# Patient Record
(demographics unavailable — no encounter records)

---

## 2025-03-26 NOTE — HISTORY OF PRESENT ILLNESS
[FreeTextEntry1] : Arabella is a 63 y/o female here for a consultation visit, hemorrhoids.   Colonoscopy on 11/13/24 - Hemorrhoids found on perianal exam. Internal hemorrhoids. Diverticulosis in the descending colon. No specimens collected.

## 2025-03-26 NOTE — HISTORY OF PRESENT ILLNESS
[FreeTextEntry1] : Arabella is a 61 y/o female here for a consultation visit, hemorrhoids.   Colonoscopy on 11/13/24 - Hemorrhoids found on perianal exam. Internal hemorrhoids. Diverticulosis in the descending colon. No specimens collected.

## 2025-04-14 NOTE — CONSULT LETTER
[Dear  ___] : Dear  [unfilled], [Courtesy Letter:] : I had the pleasure of seeing your patient, [unfilled], in my office today. [Please see my note below.] : Please see my note below. [Sincerely,] : Sincerely, [FreeTextEntry2] : Dr Momo Baumann [FreeTextEntry3] : Palma Bettencourt M.D., F.A.C.S., F.A.S.C.R.S. Assistant Professor of Surgery [employer] [facility] [employer] at [facility]

## 2025-04-14 NOTE — CONSULT LETTER
[Dear  ___] : Dear  [unfilled], [Courtesy Letter:] : I had the pleasure of seeing your patient, [unfilled], in my office today. [Please see my note below.] : Please see my note below. [Sincerely,] : Sincerely, [FreeTextEntry2] : Dr Momo Baumann [FreeTextEntry3] : Palma Bettencourt M.D., F.A.C.S., F.A.S.C.R.S. Assistant Professor of Surgery [employer] [facility] [employer] at [facility]

## 2025-04-14 NOTE — HISTORY OF PRESENT ILLNESS
[FreeTextEntry1] : Arabella is a 63 y/o female here for a follow up visit.   Colonoscopy on 11/13/24 - Hemorrhoids found on perianal exam. Internal hemorrhoids. Diverticulosis in the descending colon. No specimens collected.  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 Patient reports intermittent passage of stool through vagina and urine, with fecal odor in urine. Symptoms began in summer 2024. Previous investigations include CT showing thickened bladder and colonic inflammation, cystoscopy revealing feces in bladder, and incomplete colonoscopy due to severe stenosis in distal descending colon. Physical examination unremarkable. High fistula suspected, likely due to diverticulitis, but malignancy cannot be ruled out.  - Plan:  - Obtain CT of abdomen and pelvis with PO and IV contrast  - Order urinalysis with reflex urine culture  - Schedule follow-up appointment next Monday at [facility] office to discuss CT results and finalize treatment plan  - Discussed potential diagnoses including colovesical and colovaginal fistula  - Explained possible etiologies: diverticulitis, malignancy, or IBD  - Informed patient that treatment typically includes surgery and provided details of the surgical procedure.  CT A+P on 4/3/25 - Sigmoid diverticulosis with wall thickening of the distal sigmoid colon abutting the superolateral aspect of the urinary bladder with likely fistulization into the bladder. Air is seen within the urinary bladder and there is wall thickening of the left superior and lateral bladder.  Today pt reports feeling no pain.  Formed BMs daily, no straining, no bleeding.  Still sees flakes of fecal matter from the vagina and occasional vaginal pain when voiding.  Denies swelling or prolapse tissues.    No episodes of incontinence of stool or flatus.  Good appetite.  No c/o nausea or vomiting.  Denies fever and chills.  Not on anticoagulants.

## 2025-04-14 NOTE — ASSESSMENT
[FreeTextEntry1] :  62-year-old female presenting with fecaluria and CT consistent with colovesical fistula. The patient in our first consultation reported brown discharge from the vagina and fecal excrement in the urine.  Today she states that what she meant to say when describing discharge from the vagina was actually the feces in the urine. Symptoms began in summer 2024. Had incomplete colonoscopy due to described severe stenosis in distal descending colon.  Patient with at least colovaginal fistula, possible colovaginal fistula as well, most likely due to diverticulitis, but malignancy cannot be ruled out. Her urine culture was positive for UTI and antibiotics were prescribed. I recommended robotic assisted/laparoscopic sigmoid resection, possible open, with takedown of colovesical fistula. I discussed that the plan is for colon resection with anastomosis. I explained there is small possibility of need for an end colostomy and a possibility for a diverting loop ileostomy depending on the extent inflammation found intraoperatively.   I discussed the details, risks, benefits, alternatives of the procedure and expectations of recovery with the patient. I explained urology will be placing ureteral catheters at the beginning of the procedure.  I advised for gynecology standby for the possibility of the uterus or the left fallopian tube or ovary being involved in the disease process She will be under the Enhanced Recovery Protocol and I discussed the details of the protocol and the significant impact in early mobilization, pain control and faster return of bowel fx. She needs to work currently and looking to schedule the surgery for end of June.  I advised her to seek medical attention if she develops abdominal pain or fever. She will need urology and gynecology consultation and medical clearance. I will obtain a CT colonography preoperatively. All questions answered to the patient's satisfaction.

## 2025-04-14 NOTE — PHYSICAL EXAM
[FreeTextEntry1] : This is a 62 year-old well-developed female in no apparent distress.  Abdomen soft, nontender, nondistended, no masses.   Psychiatric-oriented to time place and person. Good understanding of conversation.

## 2025-04-14 NOTE — CONSULT LETTER
[Dear  ___] : Dear  [unfilled], [Courtesy Letter:] : I had the pleasure of seeing your patient, [unfilled], in my office today. [Please see my note below.] : Please see my note below. [Sincerely,] : Sincerely, [FreeTextEntry2] : Dr Momo Baumann [FreeTextEntry3] : Palma Bettencourt M.D., F.A.C.S., F.A.S.C.R.S. Assistant Professor of Surgery [employer] [facility] [employer] at [facility]

## 2025-05-16 NOTE — HISTORY OF PRESENT ILLNESS
[FreeTextEntry1] : 2025 ( Dr Palma Moon) " I recommended robotic assisted/laparoscopic sigmoid resection, possible open, with takedown of colovesical fistula. I discussed that the plan is for colon resection with anastomosis. I explained there is small possibility of need for an end colostomy and a possibility for a diverting loop ileostomy depending on the extent inflammation found intraoperatively. I discussed the details, risks, benefits, alternatives of the procedure and expectations of recovery with the patient. I explained urology will be placing ureteral catheters at the beginning of the procedure. I advised for gynecology standby for the possibility of the uterus or the left fallopian tube or ovary being involved in the disease process".   patient wht hx of dysuria and vag pain 2024 wiht utis but not resolved - inital CT did not see fistula and recenlty had fecaluria and air inthe past few months  no fever , no N/V or hematria  doroteo habits good  no pelvic surgery or radiation or hx of diverticultis  last colonscopy 5 years ago -diverticulosis found  and agan in 2024 due to recent findings above no fh of bowel disease  not sexually active   - admits to increased water intake  CT SCAN ( 2025): images reviewed wiht patient IMPRESSION: Sigmoid diverticulosis with wall thickening of the distal sigmoid colon abutting the superolateral aspect of the urinary bladder with likely fistulization into the bladder. Air is seen within the urinary bladder and there is wall thickening of the left superior and lateral bladder.

## 2025-05-16 NOTE — ASSESSMENT
[FreeTextEntry1] : 1- u cath placement risks and benefits discussed - brochure given  2- patient understands I will not be operating surgeon for procedure

## 2025-05-16 NOTE — PHYSICAL EXAM
[Normal Appearance] : normal appearance [Well Groomed] : well groomed [General Appearance - In No Acute Distress] : no acute distress [Edema] : no peripheral edema [Respiration, Rhythm And Depth] : normal respiratory rhythm and effort [Exaggerated Use Of Accessory Muscles For Inspiration] : no accessory muscle use [Abdomen Soft] : soft [Abdomen Tenderness] : non-tender [Costovertebral Angle Tenderness] : no ~M costovertebral angle tenderness [Normal Station and Gait] : the gait and station were normal for the patient's age [] : no rash [No Focal Deficits] : no focal deficits [Oriented To Time, Place, And Person] : oriented to person, place, and time [Affect] : the affect was normal [Mood] : the mood was normal [No Palpable Adenopathy] : no palpable adenopathy [de-identified] : umbilcal [place]den

## 2025-07-21 NOTE — CONSULT LETTER
[Dear  ___] : Dear  [unfilled], [Courtesy Letter:] : I had the pleasure of seeing your patient, [unfilled], in my office today. [Please see my note below.] : Please see my note below. [Sincerely,] : Sincerely, [FreeTextEntry2] : Dr Momo Baumann [FreeTextEntry1] : Pls see the attached op and path reports [FreeTextEntry3] : Palma Bettencourt M.D., F.A.C.S., F.A.S.C.R.S. Assistant Professor of Surgery [employer] [facility] [employer] at [facility]

## 2025-07-21 NOTE — PHYSICAL EXAM
[FreeTextEntry1] : Comfortable. Abdomen soft, non-tender, non-distended. Port incisions & midline extraction wound healing well. No hernias.

## 2025-07-21 NOTE — ASSESSMENT
[FreeTextEntry1] : Doing well postop. Path discussed.  Instructions for diet (continue LRD until next visit), activity, wound care given, all questions answered. Take a dose of Miralax if no BMs for more than a day. Will send UA, reflex Cx  RTO 8/11

## 2025-07-21 NOTE — HISTORY OF PRESENT ILLNESS
[FreeTextEntry1] : Arabella is a 61 y/o female here for a post-op visit. She is s/p 7/10/25-  Robotic assisted/laparoscopic sigmoid resection with low pelvic anastomosis and takedown of splenic flexure. Repair of colovesical fistula.  Pathology: Colovesical fistula, excision - Colon and fibromuscular tissue with granulation tissue 2. Colon, sigmoid, low anterior resection - Segment of colon with diverticulosis, acute diverticulitis, abscess formation and transmural fistula formation - Three reactive lymph nodes 3. Anvil with donuts, excision - Annular segment of colon with focal diverticula.  Colonoscopy 11/13/24 - Hemorrhoids found on perianal exam.  Internal hemorrhoids.  Diverticulosis in the descending colon.  Stricture in the distal descending colon.  No specimens collected.    Today patient reports mild surgical incisional discomfort takes Tylenol with some relief.  Soft BMs 1-2 once daily.  Good appetite.  Denies nausea or vomiting.  No fever or chills.  No incontinence of stool or flatus.   Reports mild pain when urinating

## 2025-07-21 NOTE — CONSULT LETTER
[Dear  ___] : Dear  [unfilled], [Courtesy Letter:] : I had the pleasure of seeing your patient, [unfilled], in my office today. [Please see my note below.] : Please see my note below. [Sincerely,] : Sincerely, [FreeTextEntry2] : Dr Momo Baumann [FreeTextEntry1] : Pls see the attached op and path reports [FreeTextEntry3] : Palma Bettencourt M.D., F.A.C.S., F.A.S.C.R.S. Assistant Professor of Surgery [employer] [facility] [employer] at [facility]